# Patient Record
Sex: MALE | Race: WHITE | NOT HISPANIC OR LATINO | URBAN - METROPOLITAN AREA
[De-identification: names, ages, dates, MRNs, and addresses within clinical notes are randomized per-mention and may not be internally consistent; named-entity substitution may affect disease eponyms.]

---

## 2023-05-04 ENCOUNTER — OFFICE VISIT (OUTPATIENT)
Dept: PRIMARY CARE | Facility: CLINIC | Age: 68
End: 2023-05-04
Payer: COMMERCIAL

## 2023-05-04 VITALS
BODY MASS INDEX: 27.06 KG/M2 | HEIGHT: 70 IN | DIASTOLIC BLOOD PRESSURE: 78 MMHG | WEIGHT: 189 LBS | RESPIRATION RATE: 20 BRPM | SYSTOLIC BLOOD PRESSURE: 116 MMHG | HEART RATE: 69 BPM

## 2023-05-04 DIAGNOSIS — M19.90 OSTEOARTHRITIS, UNSPECIFIED OSTEOARTHRITIS TYPE, UNSPECIFIED SITE: ICD-10-CM

## 2023-05-04 DIAGNOSIS — M54.50 LOW BACK PAIN, UNSPECIFIED BACK PAIN LATERALITY, UNSPECIFIED CHRONICITY, UNSPECIFIED WHETHER SCIATICA PRESENT: ICD-10-CM

## 2023-05-04 DIAGNOSIS — K21.9 GASTROESOPHAGEAL REFLUX DISEASE, UNSPECIFIED WHETHER ESOPHAGITIS PRESENT: ICD-10-CM

## 2023-05-04 DIAGNOSIS — M19.90 ARTHRITIS: Primary | ICD-10-CM

## 2023-05-04 DIAGNOSIS — I10 ESSENTIAL HYPERTENSION: ICD-10-CM

## 2023-05-04 DIAGNOSIS — J43.9 PULMONARY EMPHYSEMA, UNSPECIFIED EMPHYSEMA TYPE (MULTI): ICD-10-CM

## 2023-05-04 PROCEDURE — 4004F PT TOBACCO SCREEN RCVD TLK: CPT | Performed by: INTERNAL MEDICINE

## 2023-05-04 PROCEDURE — 3078F DIAST BP <80 MM HG: CPT | Performed by: INTERNAL MEDICINE

## 2023-05-04 PROCEDURE — 1159F MED LIST DOCD IN RCRD: CPT | Performed by: INTERNAL MEDICINE

## 2023-05-04 PROCEDURE — 3074F SYST BP LT 130 MM HG: CPT | Performed by: INTERNAL MEDICINE

## 2023-05-04 PROCEDURE — 99204 OFFICE O/P NEW MOD 45 MIN: CPT | Performed by: INTERNAL MEDICINE

## 2023-05-04 RX ORDER — ALLOPURINOL 100 MG/1
100 TABLET ORAL
COMMUNITY

## 2023-05-04 RX ORDER — HYDROCODONE BITARTRATE AND ACETAMINOPHEN 7.5; 325 MG/1; MG/1
1 TABLET ORAL EVERY 12 HOURS PRN
COMMUNITY

## 2023-05-04 RX ORDER — CYCLOBENZAPRINE HCL 10 MG
TABLET ORAL
COMMUNITY
Start: 2017-08-02

## 2023-05-04 RX ORDER — CHLORTHALIDONE 25 MG/1
12.5 TABLET ORAL DAILY
COMMUNITY

## 2023-05-04 RX ORDER — FERROUS SULFATE 325(65) MG
TABLET ORAL
COMMUNITY

## 2023-05-04 RX ORDER — ATORVASTATIN CALCIUM 10 MG/1
TABLET, FILM COATED ORAL
COMMUNITY

## 2023-05-04 RX ORDER — MONTELUKAST SODIUM 10 MG/1
1 TABLET ORAL DAILY
COMMUNITY
Start: 2016-04-29

## 2023-05-04 RX ORDER — PANTOPRAZOLE SODIUM 40 MG/1
TABLET, DELAYED RELEASE ORAL
COMMUNITY
Start: 2016-11-25

## 2023-05-04 RX ORDER — AMLODIPINE BESYLATE 2.5 MG/1
2.5 TABLET ORAL DAILY
COMMUNITY

## 2023-05-04 RX ORDER — FLUTICASONE FUROATE, UMECLIDINIUM BROMIDE AND VILANTEROL TRIFENATATE 100; 62.5; 25 UG/1; UG/1; UG/1
1 POWDER RESPIRATORY (INHALATION) DAILY
COMMUNITY
Start: 2023-03-09

## 2023-05-04 RX ORDER — METHYLPREDNISOLONE 4 MG/1
TABLET ORAL
COMMUNITY
Start: 2023-03-09

## 2023-05-04 RX ORDER — TIZANIDINE 4 MG/1
TABLET ORAL
COMMUNITY
Start: 2016-09-30

## 2023-05-04 RX ORDER — TADALAFIL 20 MG/1
TABLET ORAL
COMMUNITY

## 2023-05-04 RX ORDER — HYDROCHLOROTHIAZIDE 12.5 MG/1
CAPSULE ORAL
COMMUNITY

## 2023-05-04 RX ORDER — FLUTICASONE FUROATE AND VILANTEROL 100; 25 UG/1; UG/1
POWDER RESPIRATORY (INHALATION)
COMMUNITY
Start: 2017-04-26

## 2023-05-04 RX ORDER — VENLAFAXINE HYDROCHLORIDE 150 MG/1
150 CAPSULE, EXTENDED RELEASE ORAL
COMMUNITY
Start: 2022-10-31

## 2023-05-04 RX ORDER — TAMSULOSIN HYDROCHLORIDE 0.4 MG/1
CAPSULE ORAL
COMMUNITY

## 2023-05-04 RX ORDER — SERTRALINE HYDROCHLORIDE 25 MG/1
25 TABLET, FILM COATED ORAL DAILY
COMMUNITY
Start: 2023-03-02

## 2023-05-04 RX ORDER — VENLAFAXINE HYDROCHLORIDE 75 MG/1
75 CAPSULE, EXTENDED RELEASE ORAL
COMMUNITY
Start: 2022-11-04

## 2023-05-04 RX ORDER — ALPRAZOLAM 0.5 MG/1
0.5 TABLET ORAL
COMMUNITY
Start: 2023-05-01

## 2023-05-04 RX ORDER — COVID-19 ANTIGEN TEST
KIT MISCELLANEOUS
COMMUNITY
Start: 2022-09-08

## 2023-05-04 ASSESSMENT — PAIN SCALES - GENERAL: PAINLEVEL: 8

## 2023-06-07 PROBLEM — I10 ESSENTIAL HYPERTENSION: Status: ACTIVE | Noted: 2023-06-07

## 2023-06-07 PROBLEM — M54.50 LOW BACK PAIN: Status: ACTIVE | Noted: 2023-06-07

## 2023-06-07 PROBLEM — M19.90 DJD (DEGENERATIVE JOINT DISEASE): Status: ACTIVE | Noted: 2023-06-07

## 2023-06-07 PROBLEM — K21.9 GERD (GASTROESOPHAGEAL REFLUX DISEASE): Status: ACTIVE | Noted: 2023-06-07

## 2023-06-07 PROBLEM — J43.9 EMPHYSEMA/COPD (MULTI): Status: ACTIVE | Noted: 2023-06-07

## 2023-06-07 PROBLEM — M19.90 ARTHRITIS: Status: ACTIVE | Noted: 2023-06-07

## 2023-06-08 NOTE — PROGRESS NOTES
"Subjective   Elijah Parker is a 67 y.o. male who presents for Med Refill (Needs pain medication filled. ).  Patient presents for pain medicine refill.  He follows with Dr. Costello.  He is out of his Norco.  Patient advised that he can have a short prescription so he does not go through withdrawal, but he will need to follow-up with his PCP for a standard refill if warranted.    Med Refill        Objective     /78 (BP Location: Right arm, Patient Position: Sitting, BP Cuff Size: Adult)   Pulse 69   Resp 20   Ht 1.778 m (5' 10\")   Wt 85.7 kg (189 lb)   BMI 27.12 kg/m²      Physical Exam  Constitutional:       Appearance: Normal appearance.   HENT:      Head: Normocephalic and atraumatic.      Nose: Nose normal.      Mouth/Throat:      Mouth: Mucous membranes are moist.      Pharynx: Oropharynx is clear.   Eyes:      Extraocular Movements: Extraocular movements intact.      Pupils: Pupils are equal, round, and reactive to light.   Cardiovascular:      Rate and Rhythm: Normal rate and regular rhythm.   Pulmonary:      Effort: No respiratory distress.      Breath sounds: Normal breath sounds. No wheezing, rhonchi or rales.   Abdominal:      General: Bowel sounds are normal. There is no distension.      Palpations: Abdomen is soft.      Tenderness: There is no abdominal tenderness. There is no guarding.   Musculoskeletal:      Right lower leg: No edema.      Left lower leg: No edema.   Skin:     General: Skin is warm and dry.   Neurological:      Mental Status: He is alert and oriented to person, place, and time. Mental status is at baseline.   Psychiatric:         Mood and Affect: Mood normal.         Behavior: Behavior normal.         Assessment/Plan   Problem List Items Addressed This Visit          Respiratory    Emphysema/COPD (CMS/HCC)       Circulatory    Essential hypertension       Digestive    GERD (gastroesophageal reflux disease)       Musculoskeletal    Arthritis - Primary    DJD (degenerative joint " disease)       Other    Low back pain     Short prescription of Norco provided to avoid withdrawal.  Patient to follow-up with PCP for additional refills if warranted.       Jason Hess, DO

## 2024-01-31 ENCOUNTER — LAB (OUTPATIENT)
Dept: LAB | Facility: LAB | Age: 69
End: 2024-01-31
Payer: COMMERCIAL

## 2024-01-31 DIAGNOSIS — D64.9 ANEMIA, UNSPECIFIED: ICD-10-CM

## 2024-01-31 DIAGNOSIS — E78.5 HYPERLIPIDEMIA, UNSPECIFIED: ICD-10-CM

## 2024-01-31 DIAGNOSIS — I10 ESSENTIAL (PRIMARY) HYPERTENSION: Primary | ICD-10-CM

## 2024-01-31 DIAGNOSIS — M10.9 GOUT, UNSPECIFIED: ICD-10-CM

## 2024-01-31 LAB
ANION GAP SERPL CALC-SCNC: 13 MMOL/L (ref 10–20)
BASOPHILS # BLD AUTO: 0.08 X10*3/UL (ref 0–0.1)
BASOPHILS NFR BLD AUTO: 1 %
BUN SERPL-MCNC: 22 MG/DL (ref 6–23)
CALCIUM SERPL-MCNC: 9.2 MG/DL (ref 8.6–10.3)
CHLORIDE SERPL-SCNC: 100 MMOL/L (ref 98–107)
CHOLEST SERPL-MCNC: 156 MG/DL (ref 0–199)
CHOLESTEROL/HDL RATIO: 2.5
CO2 SERPL-SCNC: 27 MMOL/L (ref 21–32)
CREAT SERPL-MCNC: 0.87 MG/DL (ref 0.5–1.3)
EGFRCR SERPLBLD CKD-EPI 2021: >90 ML/MIN/1.73M*2
EOSINOPHIL # BLD AUTO: 0.2 X10*3/UL (ref 0–0.7)
EOSINOPHIL NFR BLD AUTO: 2.6 %
ERYTHROCYTE [DISTWIDTH] IN BLOOD BY AUTOMATED COUNT: 12.4 % (ref 11.5–14.5)
GLUCOSE SERPL-MCNC: 97 MG/DL (ref 74–99)
HCT VFR BLD AUTO: 42.7 % (ref 41–52)
HDLC SERPL-MCNC: 62.4 MG/DL
HGB BLD-MCNC: 15 G/DL (ref 13.5–17.5)
IMM GRANULOCYTES # BLD AUTO: 0.03 X10*3/UL (ref 0–0.7)
IMM GRANULOCYTES NFR BLD AUTO: 0.4 % (ref 0–0.9)
LDLC SERPL CALC-MCNC: 71 MG/DL
LYMPHOCYTES # BLD AUTO: 2.3 X10*3/UL (ref 1.2–4.8)
LYMPHOCYTES NFR BLD AUTO: 29.6 %
MCH RBC QN AUTO: 32.3 PG (ref 26–34)
MCHC RBC AUTO-ENTMCNC: 35.1 G/DL (ref 32–36)
MCV RBC AUTO: 92 FL (ref 80–100)
MONOCYTES # BLD AUTO: 0.83 X10*3/UL (ref 0.1–1)
MONOCYTES NFR BLD AUTO: 10.7 %
NEUTROPHILS # BLD AUTO: 4.32 X10*3/UL (ref 1.2–7.7)
NEUTROPHILS NFR BLD AUTO: 55.7 %
NON HDL CHOLESTEROL: 94 MG/DL (ref 0–149)
NRBC BLD-RTO: 0 /100 WBCS (ref 0–0)
PLATELET # BLD AUTO: 233 X10*3/UL (ref 150–450)
POTASSIUM SERPL-SCNC: 3.7 MMOL/L (ref 3.5–5.3)
PSA SERPL-MCNC: 0.86 NG/ML
RBC # BLD AUTO: 4.64 X10*6/UL (ref 4.5–5.9)
SODIUM SERPL-SCNC: 136 MMOL/L (ref 136–145)
TRIGL SERPL-MCNC: 111 MG/DL (ref 0–149)
URATE SERPL-MCNC: 8.9 MG/DL (ref 4–7.5)
VLDL: 22 MG/DL (ref 0–40)
WBC # BLD AUTO: 7.8 X10*3/UL (ref 4.4–11.3)

## 2024-01-31 PROCEDURE — 84550 ASSAY OF BLOOD/URIC ACID: CPT

## 2024-01-31 PROCEDURE — 85025 COMPLETE CBC W/AUTO DIFF WBC: CPT

## 2024-01-31 PROCEDURE — 84153 ASSAY OF PSA TOTAL: CPT

## 2024-01-31 PROCEDURE — 36415 COLL VENOUS BLD VENIPUNCTURE: CPT

## 2024-01-31 PROCEDURE — 80048 BASIC METABOLIC PNL TOTAL CA: CPT

## 2024-01-31 PROCEDURE — 80061 LIPID PANEL: CPT

## 2024-04-29 ENCOUNTER — LAB (OUTPATIENT)
Dept: LAB | Facility: LAB | Age: 69
End: 2024-04-29
Payer: COMMERCIAL

## 2024-04-29 DIAGNOSIS — Z63.79 OTHER STRESSFUL LIFE EVENTS AFFECTING FAMILY AND HOUSEHOLD: Primary | ICD-10-CM

## 2024-04-29 LAB
AMPHETAMINES UR QL SCN: NORMAL
BARBITURATES UR QL SCN: NORMAL
BENZODIAZ UR QL SCN: NORMAL
BZE UR QL SCN: NORMAL
CANNABINOIDS UR QL SCN: NORMAL
FENTANYL+NORFENTANYL UR QL SCN: NORMAL
METHADONE UR QL SCN: NORMAL
OPIATES UR QL SCN: NORMAL
OXYCODONE+OXYMORPHONE UR QL SCN: NORMAL
PCP UR QL SCN: NORMAL

## 2024-04-29 PROCEDURE — 80307 DRUG TEST PRSMV CHEM ANLYZR: CPT

## 2024-06-24 ENCOUNTER — HOSPITAL ENCOUNTER (EMERGENCY)
Facility: HOSPITAL | Age: 69
Discharge: HOME | End: 2024-06-24
Attending: EMERGENCY MEDICINE
Payer: COMMERCIAL

## 2024-06-24 VITALS
HEART RATE: 68 BPM | TEMPERATURE: 97.5 F | SYSTOLIC BLOOD PRESSURE: 110 MMHG | BODY MASS INDEX: 26.48 KG/M2 | DIASTOLIC BLOOD PRESSURE: 72 MMHG | HEIGHT: 70 IN | RESPIRATION RATE: 18 BRPM | OXYGEN SATURATION: 96 % | WEIGHT: 185 LBS
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
## 2024-06-24 PROCEDURE — 2500000001 HC RX 250 WO HCPCS SELF ADMINISTERED DRUGS (ALT 637 FOR MEDICARE OP)

## 2024-06-24 PROCEDURE — 99283 EMERGENCY DEPT VISIT LOW MDM: CPT | Performed by: EMERGENCY MEDICINE

## 2024-06-24 RX ORDER — OXYCODONE HYDROCHLORIDE 5 MG/1
5 TABLET ORAL ONCE
Status: COMPLETED | OUTPATIENT
Start: 2024-06-24 | End: 2024-06-24

## 2024-06-24 RX ADMIN — OXYCODONE HYDROCHLORIDE 5 MG: 5 TABLET ORAL at 10:32

## 2024-06-24 ASSESSMENT — COLUMBIA-SUICIDE SEVERITY RATING SCALE - C-SSRS
6. HAVE YOU EVER DONE ANYTHING, STARTED TO DO ANYTHING, OR PREPARED TO DO ANYTHING TO END YOUR LIFE?: NO
1. IN THE PAST MONTH, HAVE YOU WISHED YOU WERE DEAD OR WISHED YOU COULD GO TO SLEEP AND NOT WAKE UP?: NO
2. HAVE YOU ACTUALLY HAD ANY THOUGHTS OF KILLING YOURSELF?: NO

## 2024-06-24 ASSESSMENT — PAIN DESCRIPTION - LOCATION: LOCATION: LEG

## 2024-06-24 ASSESSMENT — PAIN SCALES - GENERAL: PAINLEVEL_OUTOF10: 8

## 2024-06-24 ASSESSMENT — PAIN DESCRIPTION - ORIENTATION: ORIENTATION: LEFT;LOWER
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
## 2024-06-24 NOTE — ED TRIAGE NOTES
Pt states dog bite on Friday, seen at [facility] for stitches and abx, states pain and increased swelling

## 2024-06-24 NOTE — DISCHARGE INSTRUCTIONS
You were evaluated for a dog bite that occurred a few days ago.  We did evaluate for signs of infection, your wound appears to be healing appropriately at this time, however you should complete the course of previously prescribed antibiotics.  You may perform dressing changes utilizing bacitracin ointment over the wound.  We have sent referral for plastic surgery, you may additionally follow-up with your primary care provider.  You may alternate Tylenol and Motrin for pain and utilize leg elevation for swelling.  Should you notice any worsening redness tracking up your leg, have fevers chills or thick yellow-white drainage coming from the wound you should return to the emergency department for evaluation.

## 2024-07-29 ENCOUNTER — HOSPITAL ENCOUNTER (EMERGENCY)
Facility: HOSPITAL | Age: 69
Discharge: HOME | End: 2024-07-29
Payer: COMMERCIAL

## 2024-07-29 ENCOUNTER — APPOINTMENT (OUTPATIENT)
Dept: RADIOLOGY | Facility: HOSPITAL | Age: 69
End: 2024-07-29
Payer: COMMERCIAL

## 2024-07-29 ENCOUNTER — APPOINTMENT (OUTPATIENT)
Dept: VASCULAR MEDICINE | Facility: HOSPITAL | Age: 69
End: 2024-07-29
Payer: COMMERCIAL

## 2024-07-29 VITALS
HEART RATE: 62 BPM | HEIGHT: 70 IN | DIASTOLIC BLOOD PRESSURE: 80 MMHG | TEMPERATURE: 97 F | BODY MASS INDEX: 28.63 KG/M2 | RESPIRATION RATE: 17 BRPM | WEIGHT: 200 LBS | OXYGEN SATURATION: 96 % | SYSTOLIC BLOOD PRESSURE: 136 MMHG

## 2024-07-29 DIAGNOSIS — M79.605 ACUTE PAIN OF LEFT LOWER EXTREMITY: Primary | ICD-10-CM

## 2024-07-29 DIAGNOSIS — M79.605 PAIN IN LEFT LEG: ICD-10-CM

## 2024-07-29 PROCEDURE — 2500000001 HC RX 250 WO HCPCS SELF ADMINISTERED DRUGS (ALT 637 FOR MEDICARE OP): Performed by: NURSE PRACTITIONER

## 2024-07-29 PROCEDURE — 93971 EXTREMITY STUDY: CPT

## 2024-07-29 PROCEDURE — 99284 EMERGENCY DEPT VISIT MOD MDM: CPT | Mod: 25

## 2024-07-29 PROCEDURE — 73560 X-RAY EXAM OF KNEE 1 OR 2: CPT | Mod: LEFT SIDE | Performed by: RADIOLOGY

## 2024-07-29 PROCEDURE — 93971 EXTREMITY STUDY: CPT | Performed by: STUDENT IN AN ORGANIZED HEALTH CARE EDUCATION/TRAINING PROGRAM

## 2024-07-29 PROCEDURE — 73560 X-RAY EXAM OF KNEE 1 OR 2: CPT | Mod: LT

## 2024-07-29 PROCEDURE — 73590 X-RAY EXAM OF LOWER LEG: CPT | Mod: LEFT SIDE | Performed by: RADIOLOGY

## 2024-07-29 PROCEDURE — 73590 X-RAY EXAM OF LOWER LEG: CPT | Mod: LT

## 2024-07-29 RX ORDER — TRAMADOL HYDROCHLORIDE 50 MG/1
50 TABLET ORAL ONCE
Status: COMPLETED | OUTPATIENT
Start: 2024-07-29 | End: 2024-07-29

## 2024-07-29 ASSESSMENT — PAIN - FUNCTIONAL ASSESSMENT: PAIN_FUNCTIONAL_ASSESSMENT: 0-10

## 2024-07-29 ASSESSMENT — COLUMBIA-SUICIDE SEVERITY RATING SCALE - C-SSRS
6. HAVE YOU EVER DONE ANYTHING, STARTED TO DO ANYTHING, OR PREPARED TO DO ANYTHING TO END YOUR LIFE?: NO
2. HAVE YOU ACTUALLY HAD ANY THOUGHTS OF KILLING YOURSELF?: NO
1. IN THE PAST MONTH, HAVE YOU WISHED YOU WERE DEAD OR WISHED YOU COULD GO TO SLEEP AND NOT WAKE UP?: NO

## 2024-07-29 ASSESSMENT — PAIN SCALES - GENERAL: PAINLEVEL_OUTOF10: 7

## 2024-07-29 NOTE — ED PROVIDER NOTES
"Limitations to History: None     HPI:      Elijah Parker is a 68 y.o. with significant past medical history for emphysema/COPD, HTN and GERD presenting to ED today from home by himself for evaluation of pain in the left calf.  Today when the patient was walking his dog, he felt a \"pop\" in the ankle/calf region.  Complains of a constant throbbing pain is rated 8/10.  Exacerbated with weightbearing and ambulation.  Patient did not fall.  Recovering from a dog bite to the left lower extremity.  No history of PE/DVT, recent travel, recent surgery, history of malignancy or use of exogenous hormones.  Motrin taken earlier this morning provided minimal relief of symptoms.  Not ordered denies fever/chills, cough/cold symptoms, chest pain, shortness of breath, nausea/vomiting, abdominal pain, urinary symptoms, change in bowel habits or any other complaints.  Smoker, occasional EtOH, no drug use.  PCP is Dr. Costello.     Additional History Obtained from: None    ------------------------------------------------------------------------------------------------------------------------------------------    VS: As documented in the triage note and EMR flowsheet from this visit were reviewed.    Physical Exam:  Gen: 68-year-old  male, awake and alert, oriented x 3.  Well-nourished and hydrated.  Moderately uncomfortable, nontoxic looking.  Musculoskeletal: Tenderness in the left Achilles and calf region, no palpable cords.  DTRs intact.  Full range of motion all joints left lower extremity.  MSPs intact.    No deformities.  Neurologic: Alert, symmetrical facies, phonates clearly, moves all extremities equally, responsive to touch, ambulates normally   Skin: Pink, warm and dry.  Healing dog bite to the left lateral distal tib-fib, scabbed areas, edges approximating.  No erythema, lymphangitic streaking, visible abscesses or purulent drainage.  No rashes " "noted        ------------------------------------------------------------------------------------------------------------------------------------------    Medical Decision Makin y.o. with significant past medical history for emphysema/COPD, HTN and GERD is evaluated at the bedside for pain in the left Achilles region/calf that was noted as he was walking his dog earlier today.  Patient felt a \"pop\" he came to the ER for further evaluation.  On arrival to the ED, awake and alert, vital signs within normal limits.  Afebrile.  Seen and evaluated in triage due to high volumes in the ER.  X-rays left tib-fib/knee and venous duplex ordered.  Left lower extremity neurovascularly intact.      ED Course as of 24 1411      1247 X-ray left tib-fib and knee show no acute osseous abnormalities.  DVT study pending. [SB]   3960 DVT study shows no evidence of DVT in the left lower extremity.  There could be some tendon/ligament injury that needs further evaluation by orthopedics.  Patient does have a prior dog bite in this region however it appears to be healing, no purulent drainage or increasing erythema.  Treatment will be symptomatic.  I will treat the patient's pain here with tramadol.  He will use Motrin/Tylenol as needed.  Other comfort measures discussed.  Follow-up with orthopedics in the next 2 to 3 days.  Ace wrap to the affected area.  Return precautions discussed.  Diagnosis, treatment and plan discussed with patient, he verbalizes understanding and is in agreement.  Condition stable for discharge. [SB]      ED Course User Index  [SB] Chelsea Bear, APRN-CNP         Diagnoses as of 24 1411   Acute pain of left lower extremity       EKG interpreted by myself (ED attending physician): Not ordered    Chronic Medical Conditions Significantly Affecting Care: None    External Records Reviewed: I reviewed recent and relevant outside records including: None    Discussion of Management with Other " Providers: None    I discussed the patient/results with: None       Chelsea Bear, SANDRA-CNP  07/29/24 1415

## 2024-07-29 NOTE — ED TRIAGE NOTES
PT PRESENTS TO ED VIA PRIVATE AUTO FOR DOG BITE WOUND EVAL. PT GOT BIT BY A DOG ON 6/20. PT WAS WALKING DOGS TODAY AND FELT SOMETHING POP IN HIS LEG.

## 2024-07-29 NOTE — DISCHARGE INSTRUCTIONS
No evidence of blood clot or fracture in the left lower extremity that can be the source of your discomfort.  Could be tendon/ligament injury.  Follow-up with orthopedics in the next 2 to 3 days, call today to set up follow-up appointment.  You were given 1 tramadol here in the emergency department for pain relief.  Continue Motrin/Tylenol at home.  Other comfort measures including elevation and ice x 48 hours, heat thereafter.  Resume any normal medications.

## 2024-07-29 NOTE — ED TRIAGE NOTES
The patient was seen and examined in triage.     History of Present Illness: The patient is a 68-year-old male present emergency department for evaluation of a left lower extremity injury.  Around June 20, patient suffered a dog bite wound to the medial, distal left lower extremity.  He was on antibiotic and states this has been healing well.  He denies any surrounding redness, increased swelling, or drainage.  While walking his dogs today, he felt a pop that originated from just above the wound to the back of his leg near his knee.  He has been having increased pain with ambulating.  He still has intact range of motion of his left lower extremity.  No recent fever, chills, nausea, vomiting.  Patient is otherwise been doing fine.    Brief Physical Exam: Exam is limited by the patient sitting in a chair in triage.  Heart: Regular rate and rhythm.   Lungs: Clear to auscultation bilaterally.   Abdomen: Soft, nondistended, normoactive bowel sounds, nontender  MSK: Intact dorsi and plantarflexion of the left foot.    Plan: Appropriate labs and diagnostic imaging were ordered.     For the remainder of the patient's workup and ED course, please refer to the main ED provider note. We discussed need for diagnostic testing including laboratory studies and imaging.  We also discussed that they may be asked to wait in the waiting room while these tests are pending.  They understand that if they choose to leave without having the testing completed or resulted that we cannot rule out acute life threatening illnesses and the risks involved could lead to worsening condition, permanent disability or even death.      Disclaimer: This note was dictated by speech recognition. Minor errors in transcription may be present. Please call if questions.

## 2024-10-12 ENCOUNTER — HOSPITAL ENCOUNTER (EMERGENCY)
Facility: HOSPITAL | Age: 69
Discharge: HOME | End: 2024-10-12
Payer: COMMERCIAL

## 2024-10-12 ENCOUNTER — APPOINTMENT (OUTPATIENT)
Dept: RADIOLOGY | Facility: HOSPITAL | Age: 69
End: 2024-10-12
Payer: COMMERCIAL

## 2024-10-12 VITALS
HEIGHT: 70 IN | TEMPERATURE: 97.3 F | RESPIRATION RATE: 18 BRPM | BODY MASS INDEX: 28.63 KG/M2 | DIASTOLIC BLOOD PRESSURE: 87 MMHG | HEART RATE: 70 BPM | OXYGEN SATURATION: 97 % | SYSTOLIC BLOOD PRESSURE: 145 MMHG | WEIGHT: 200 LBS

## 2024-10-12 DIAGNOSIS — J40 BRONCHITIS: Primary | ICD-10-CM

## 2024-10-12 LAB
FLUAV RNA RESP QL NAA+PROBE: NOT DETECTED
FLUBV RNA RESP QL NAA+PROBE: NOT DETECTED
SARS-COV-2 RNA RESP QL NAA+PROBE: NOT DETECTED

## 2024-10-12 PROCEDURE — 87636 SARSCOV2 & INF A&B AMP PRB: CPT | Performed by: NURSE PRACTITIONER

## 2024-10-12 PROCEDURE — 2500000004 HC RX 250 GENERAL PHARMACY W/ HCPCS (ALT 636 FOR OP/ED): Performed by: NURSE PRACTITIONER

## 2024-10-12 PROCEDURE — 71046 X-RAY EXAM CHEST 2 VIEWS: CPT

## 2024-10-12 PROCEDURE — 99283 EMERGENCY DEPT VISIT LOW MDM: CPT

## 2024-10-12 PROCEDURE — 2500000002 HC RX 250 W HCPCS SELF ADMINISTERED DRUGS (ALT 637 FOR MEDICARE OP, ALT 636 FOR OP/ED): Performed by: NURSE PRACTITIONER

## 2024-10-12 PROCEDURE — 94640 AIRWAY INHALATION TREATMENT: CPT

## 2024-10-12 PROCEDURE — 71046 X-RAY EXAM CHEST 2 VIEWS: CPT | Mod: FOREIGN READ | Performed by: RADIOLOGY

## 2024-10-12 RX ORDER — PREDNISONE 20 MG/1
40 TABLET ORAL DAILY
Qty: 10 TABLET | Refills: 0 | Status: SHIPPED | OUTPATIENT
Start: 2024-10-12 | End: 2024-10-17

## 2024-10-12 RX ORDER — PREDNISONE 20 MG/1
60 TABLET ORAL ONCE
Status: COMPLETED | OUTPATIENT
Start: 2024-10-12 | End: 2024-10-12

## 2024-10-12 RX ORDER — ALBUTEROL SULFATE 90 UG/1
2 INHALANT RESPIRATORY (INHALATION) EVERY 4 HOURS PRN
Qty: 18 G | Refills: 0 | Status: SHIPPED | OUTPATIENT
Start: 2024-10-12 | End: 2024-11-11

## 2024-10-12 RX ORDER — IPRATROPIUM BROMIDE AND ALBUTEROL SULFATE 2.5; .5 MG/3ML; MG/3ML
3 SOLUTION RESPIRATORY (INHALATION) ONCE
Status: COMPLETED | OUTPATIENT
Start: 2024-10-12 | End: 2024-10-12

## 2024-10-12 RX ADMIN — PREDNISONE 60 MG: 20 TABLET ORAL at 09:34

## 2024-10-12 RX ADMIN — IPRATROPIUM BROMIDE AND ALBUTEROL SULFATE 3 ML: .5; 3 SOLUTION RESPIRATORY (INHALATION) at 09:33

## 2024-10-12 ASSESSMENT — PAIN SCALES - GENERAL: PAINLEVEL_OUTOF10: 0 - NO PAIN

## 2024-10-12 ASSESSMENT — LIFESTYLE VARIABLES
EVER FELT BAD OR GUILTY ABOUT YOUR DRINKING: NO
EVER HAD A DRINK FIRST THING IN THE MORNING TO STEADY YOUR NERVES TO GET RID OF A HANGOVER: NO
HAVE YOU EVER FELT YOU SHOULD CUT DOWN ON YOUR DRINKING: NO
HAVE PEOPLE ANNOYED YOU BY CRITICIZING YOUR DRINKING: NO
TOTAL SCORE: 0

## 2024-10-12 ASSESSMENT — COLUMBIA-SUICIDE SEVERITY RATING SCALE - C-SSRS
2. HAVE YOU ACTUALLY HAD ANY THOUGHTS OF KILLING YOURSELF?: NO
6. HAVE YOU EVER DONE ANYTHING, STARTED TO DO ANYTHING, OR PREPARED TO DO ANYTHING TO END YOUR LIFE?: NO
1. IN THE PAST MONTH, HAVE YOU WISHED YOU WERE DEAD OR WISHED YOU COULD GO TO SLEEP AND NOT WAKE UP?: NO

## 2024-10-12 ASSESSMENT — PAIN - FUNCTIONAL ASSESSMENT: PAIN_FUNCTIONAL_ASSESSMENT: 0-10

## 2024-10-12 NOTE — DISCHARGE INSTRUCTIONS
COVID/influenza are negative.  You do not have pneumonia on x-ray.  You are treated for acute bronchitis.  Please finish your Zithromax as prescribed by PCP.  You have been given a prescription for albuterol inhaler for cough/wheeze.  You are also given steroids for the next 5 days, start tomorrow and continue daily.  Increase fluids.  Rest.  Motrin/Tylenol.  Follow-up with PCP in the office in the next 2 to 3 days, call today to set up follow-up appointment.  Resume normal medications.

## 2024-10-12 NOTE — ED PROVIDER NOTES
Limitations to History: None     HPI:      Elijah Parker is a 68 y.o. with with significant past medical history for DM presenting to ED today from home by himself for evaluation of a cough.  4 days ago the patient was seen by PCP Dr. Costello for cough.  Started on Zithromax.  Patient has had the initial loading dose and 1 dose this morning.  Patient states symptoms have not improved he continues to have a productive cough of yellow phlegm and mild wheezing.  The patient feels weak.  He has had prior episodes of pneumonia and became concerned.  Denies fever/chills, chest pain, shortness of breath, nausea/vomiting, abdominal pain, urinary symptoms, change in bowel habits or any other complaints.  Smoker, no EtOH or IV drugs.    Additional History Obtained from: Triage/nursing notes reviewed.    ------------------------------------------------------------------------------------------------------------------------------------------    VS: As documented in the triage note and EMR flowsheet from this visit were reviewed.    Physical Exam:  Gen: 68-year-old male, awake and alert, oriented x 3.  Well-nourished and hydrated.  Nontoxic looking.  Head/Neck: NCAT, neck w/ FROM  Eyes: EOMI, PERRL, anicteric sclerae, noninjected conjunctivae  Ears: TMs clear b/l without sign of infection  Nose: Nares patent w/o rhinorrhea  Mouth:  MMM, no OP lesions noted.  Posterior oropharynx is clear.  Heart: RRR no MRG  Lungs: Intermittent coughing.  Expiratory wheeze throughout all fields.  No rales or rhonchi.  No accessory muscle use.  No stridor.  Abdomen: soft, NT, ND, no HSM, no palpable masses  Musculoskeletal: CHRISTI x 4.  MSPs intact.  Skin intact.  No deformities.  Neurologic: Alert, symmetrical facies, phonates clearly, moves all extremities equally, responsive to touch, ambulates normally   Skin: Pink, warm and dry.  No erythema, edema or ecchymosis.  No rashes noted  Psychological: calm, no  SI/HI      ------------------------------------------------------------------------------------------------------------------------------------------    Medical Decision Makin-year-old male with known HTN is evaluated at the bedside for a productive cough and wheezing.  Patient has had 2 doses of Zithromax and states he is not improved.  On arrival to the ED, blood pressure 138/79 with a heart rate of 72, patient is not hypoxic or febrile.  Patient is a expiratory wheeze throughout all fields.  Differential includes COVID/influenza, bronchitis and pneumonia.  Swab for COVID/influenza.  Chest x-ray to rule out pneumonia.  Patient is given a DuoNeb treatment and started on a steroid burst with 60 mg of p.o. prednisone.    ED Course as of 10/12/24 1049   Sat Oct 12, 2024   1044 COVID/influenza negative.  Chest x-ray does not show evidence of pneumonia.  Patient feels significantly improved after DuoNeb treatment.  Wheezing has almost completely resolved.  Repeat vital signs within normal limits.  Not hypoxic.  I feel the patient's symptoms are due to bronchitis.  Will continue p.o. Zithromax as previously prescribed by PCP.  Additionally the patient is given albuterol inhaler for cough/wheeze.  He will continue on steroid burst for the next 5 days.  Sent to pharmacy.  Counseled to stop smoking.  Increase fluids.  Motrin/Tylenol.  Rest.  Follow-up with PCP in the next 2 to 3 days.  Return precautions and red flags discussed.  All questions were answered.  Patient verbalizes understanding and is in agreement.  Condition stable for discharge. [SB]      ED Course User Index  [SB] Chelsea Bear APRN-CNP         Diagnoses as of 10/12/24 1049   Bronchitis       EKG interpreted by myself (ED attending physician): None    Chronic Medical Conditions Significantly Affecting Care: None    External Records Reviewed: I reviewed recent and relevant outside records including: None    Discussion of Management with Other Providers:  None    I discussed the patient/results with: None       Chelsea Bear, SANDRA-INDIRA  10/12/24 1058

## 2025-06-05 ENCOUNTER — APPOINTMENT (OUTPATIENT)
Dept: RADIOLOGY | Facility: HOSPITAL | Age: 70
End: 2025-06-05
Payer: COMMERCIAL

## 2025-06-05 ENCOUNTER — HOSPITAL ENCOUNTER (EMERGENCY)
Facility: HOSPITAL | Age: 70
Discharge: HOME | End: 2025-06-05
Attending: EMERGENCY MEDICINE
Payer: COMMERCIAL

## 2025-06-05 VITALS
HEART RATE: 61 BPM | SYSTOLIC BLOOD PRESSURE: 145 MMHG | HEIGHT: 70 IN | BODY MASS INDEX: 28.35 KG/M2 | RESPIRATION RATE: 16 BRPM | TEMPERATURE: 96.8 F | WEIGHT: 198 LBS | DIASTOLIC BLOOD PRESSURE: 89 MMHG | OXYGEN SATURATION: 98 %

## 2025-06-05 DIAGNOSIS — S63.92XA HAND SPRAIN, LEFT, INITIAL ENCOUNTER: ICD-10-CM
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
## 2025-06-05 PROCEDURE — 99283 EMERGENCY DEPT VISIT LOW MDM: CPT | Performed by: EMERGENCY MEDICINE

## 2025-06-05 PROCEDURE — 73130 X-RAY EXAM OF HAND: CPT | Mod: LEFT SIDE | Performed by: INTERNAL MEDICINE

## 2025-06-05 PROCEDURE — 73130 X-RAY EXAM OF HAND: CPT | Mod: LT

## 2025-06-05 ASSESSMENT — LIFESTYLE VARIABLES
HAVE YOU EVER FELT YOU SHOULD CUT DOWN ON YOUR DRINKING: NO
EVER HAD A DRINK FIRST THING IN THE MORNING TO STEADY YOUR NERVES TO GET RID OF A HANGOVER: NO
HAVE PEOPLE ANNOYED YOU BY CRITICIZING YOUR DRINKING: NO
TOTAL SCORE: 0
EVER FELT BAD OR GUILTY ABOUT YOUR DRINKING: NO

## 2025-06-05 NOTE — ED TRIAGE NOTES
Patient states he tripped and fell Sunday- injured his left hand/pinky finger. Patients left pinky finger is bruised/swollen.
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
## 2025-06-06 ENCOUNTER — PATIENT OUTREACH (OUTPATIENT)
Dept: CARE COORDINATION | Facility: CLINIC | Age: 70
End: 2025-06-06
Payer: COMMERCIAL

## 2025-06-06 NOTE — PROGRESS NOTES
"Outreach to the patient following their recent visit to the ED to assess their needs and provide any necessary follow-up support. The attempt to reach the patient was unsuccessful, unable to make contact at this time, recording states \"vmb is full and there is not enough room to lvm.\"     Vijaya Richards RN, [facility]  Phone [phone]      " 8

## 2025-07-05 ENCOUNTER — HOSPITAL ENCOUNTER (EMERGENCY)
Facility: HOSPITAL | Age: 70
Discharge: HOME | End: 2025-07-05
Payer: COMMERCIAL

## 2025-07-05 ENCOUNTER — APPOINTMENT (OUTPATIENT)
Dept: RADIOLOGY | Facility: HOSPITAL | Age: 70
End: 2025-07-05
Payer: COMMERCIAL

## 2025-07-05 VITALS
WEIGHT: 198 LBS | TEMPERATURE: 96.8 F | SYSTOLIC BLOOD PRESSURE: 137 MMHG | BODY MASS INDEX: 28.41 KG/M2 | RESPIRATION RATE: 16 BRPM | HEART RATE: 81 BPM | DIASTOLIC BLOOD PRESSURE: 66 MMHG | OXYGEN SATURATION: 97 %

## 2025-07-05 DIAGNOSIS — S22.32XA CLOSED FRACTURE OF ONE RIB OF LEFT SIDE, INITIAL ENCOUNTER: Primary | ICD-10-CM
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
## 2025-07-05 PROCEDURE — 2500000004 HC RX 250 GENERAL PHARMACY W/ HCPCS (ALT 636 FOR OP/ED): Mod: JW | Performed by: NURSE PRACTITIONER

## 2025-07-05 PROCEDURE — 71250 CT THORAX DX C-: CPT

## 2025-07-05 PROCEDURE — 99284 EMERGENCY DEPT VISIT MOD MDM: CPT | Mod: 25

## 2025-07-05 PROCEDURE — 72131 CT LUMBAR SPINE W/O DYE: CPT

## 2025-07-05 PROCEDURE — 71250 CT THORAX DX C-: CPT | Performed by: RADIOLOGY

## 2025-07-05 PROCEDURE — 72128 CT CHEST SPINE W/O DYE: CPT

## 2025-07-05 PROCEDURE — 72128 CT CHEST SPINE W/O DYE: CPT | Performed by: RADIOLOGY

## 2025-07-05 PROCEDURE — 72131 CT LUMBAR SPINE W/O DYE: CPT | Performed by: RADIOLOGY

## 2025-07-05 PROCEDURE — 96372 THER/PROPH/DIAG INJ SC/IM: CPT | Performed by: NURSE PRACTITIONER

## 2025-07-05 RX ORDER — OXYCODONE AND ACETAMINOPHEN 5; 325 MG/1; MG/1
1 TABLET ORAL EVERY 6 HOURS PRN
Qty: 12 TABLET | Refills: 0 | Status: SHIPPED | OUTPATIENT
Start: 2025-07-05 | End: 2025-07-08

## 2025-07-05 RX ORDER — KETOROLAC TROMETHAMINE 30 MG/ML
15 INJECTION, SOLUTION INTRAMUSCULAR; INTRAVENOUS ONCE
Status: DISCONTINUED | OUTPATIENT
Start: 2025-07-05 | End: 2025-07-05

## 2025-07-05 RX ORDER — ORPHENADRINE CITRATE 30 MG/ML
60 INJECTION INTRAMUSCULAR; INTRAVENOUS ONCE
Status: COMPLETED | OUTPATIENT
Start: 2025-07-05 | End: 2025-07-05

## 2025-07-05 RX ORDER — KETOROLAC TROMETHAMINE 30 MG/ML
15 INJECTION, SOLUTION INTRAMUSCULAR; INTRAVENOUS ONCE
Status: COMPLETED | OUTPATIENT
Start: 2025-07-05 | End: 2025-07-05

## 2025-07-05 RX ADMIN — ORPHENADRINE CITRATE 60 MG: 60 INJECTION INTRAMUSCULAR; INTRAVENOUS at 10:25

## 2025-07-05 RX ADMIN — KETOROLAC TROMETHAMINE 15 MG: 30 INJECTION, SOLUTION INTRAMUSCULAR at 10:25

## 2025-07-05 NOTE — ED TRIAGE NOTES
Pt tripped and fell into a metal railing yesterday. Denies hitting head, no LOC. Not on thinners. States that he is having lower back pain that goes to his hips.

## 2025-07-05 NOTE — DISCHARGE INSTRUCTIONS
Your left posterior  11th rib is fractured.  Treatment is supportive.  Splint the rib cage when coughing and deep breathing.  Use your incentive spirometer 10 times per hour while awake.  3-day supply Percocet for severe pain.  Plain Tylenol for moderate pain.  Resume no medications.  Stay well-hydrated.  Follow-up with your PCP in the next 2 to 3 days.

## 2025-07-05 NOTE — ED PROVIDER NOTES
Limitations to History: None     HPI:      Elijah Parker is a 69 y.o.male with significant PMH for HTN, HLD, emphysema/COPD, GERD and chronic back pain presenting to ED today from home for evaluation of back pain.  Yesterday the patient leaned up against the railing on a porch, the railing gave way and he fell backwards landing on the ground.  Did not hit head or lose consciousness, no use of blood thinners.  Complaining of left posterior mid back pain rated 8/10.  Exacerbated with movement.  No medication taken prior to arrival for the symptoms.  No loss of bowel or bladder function, saddle anesthesia, history of malignancy or known osteoporosis.  No neck pain.  Denies fever/chills, cough/cold symptoms, chest pain, shortness of breath, nausea/vomiting, abdominal pain, urinary symptoms, change in bowel habits or any other complaints.  Smoker, occasional EtOH, no IV drugs.  PCP is Dr. Costello.     Additional History Obtained from: Triage/nursing notes reviewed    ------------------------------------------------------------------------------------------------------------------------------------------    VS: As documented in the triage note and EMR flowsheet from this visit were reviewed.    Physical Exam:  Gen: 69-year-old male, awake and alert, orient x 3.  Well-nourished and hydrated.  Appears uncomfortable but nontoxic.  Head/Neck: NCAT, neck w/ FROM.  No midline C-spine tenderness, no step-off.   Eyes: EOMI, PERRL, anicteric sclerae, noninjected conjunctivae  Ears: TMs clear b/l without sign of infection  Nose: Nares patent w/o rhinorrhea  Mouth:  MMM, no OP lesions noted  Heart: RRR no MRG  Lungs: CTA b/l no RRW, no increased work of breathing  Abdomen: soft, NT, ND, no HSM, no palpable masses  Musculoskeletal: Mild tenderness to midline T and L-spine, no step-off.  CHRISTI x 4.  MSPs intact.  Skin intact.  No deformities.  Tenderness on the left mid posterior rib cage with palpation.  No crepitus or palpable rib  fractures.  Neurologic: Alert, symmetrical facies, phonates clearly, moves all extremities equally, responsive to touch, ambulates normally   Skin: Pink, warm and dry.  No erythema, edema or ecchymosis.  No rashes noted  Psychological: calm, no SI/HI      ------------------------------------------------------------------------------------------------------------------------------------------    Medical Decision Makin y.o.male with significant PMH for HTN, HLD, emphysema/COPD, GERD and chronic back pain is evaluated at the bedside for left posterior mid back pain after falling off a porch that was 2 feet off the ground  yesterday when the railing gave way.  On arrival vital signs within normal limits.  Afebrile.  Not on thinners.  No head injury.  Mild midline T and L-spine tenderness extending into the left posterior rib cage, no palpable rib fractures or crepitus.    Differential includes rib fracture, vertebral fracture and muscle strain    Noncontrast CT of the thoracic/lumbar spine and chest will be obtained.  Medicated with IM Toradol/Norflex.        ED Course as of 25 1238   Sat 2025   1228 Imaging was reviewed.  CT of the thoracic and lumbar spine shows no acute fractures/subluxations or central canal stenosis but there are degenerative changes that the patient likely aggravated with this fall.  There is a mildly displaced posterior lateral left 11th rib fracture.  Patient had good pain relief from Toradol/Norflex and the application of heat.  Repeat vital signs within normal limits.  Remains neuro intact.  Discharged home.  Close follow-up with PCP in the next 2 to 3 days.  3-day supply of Percocet for severe pain.  OARRS report is clear.  Plain Tylenol for moderate pain.  Close follow-up with PCP in the next 2 to 3 days.  Will use incentive spirometer that he has at home to keep the lungs inflated as the patient is at increased risk for pneumonia due to his smoking history.  Resume any  normal medications.  Return precautions and red flags discussed.  All questions were entertained and answered.  Shared decision making conducted. [SB]      ED Course User Index  [SB] JOÃO Swenson         Diagnoses as of 07/05/25 1238   Closed fracture of one rib of left side, initial encounter   Accidental fall, initial encounter       EKG interpreted by myself (ED attending physician): Not ordered    Chronic Medical Conditions Significantly Affecting Care: None identified    External Records Reviewed: I reviewed recent and relevant outside records including: None    Discussion of Management with Other Providers: None    I discussed the patient/results with: None       JOÃO Swenson  07/05/25 1238

## 2025-07-07 ENCOUNTER — PATIENT OUTREACH (OUTPATIENT)
Dept: CARE COORDINATION | Facility: CLINIC | Age: 70
End: 2025-07-07
Payer: COMMERCIAL

## 2025-07-07 NOTE — PROGRESS NOTES
Outreach to the patient following their recent visit to the ED to assess their needs and provide any necessary follow-up support. The attempt to reach the patient was unsuccessful, unable to make contact at this time.     Vijaya Richards RN, [facility] [place]  Phone [phone]